# Patient Record
Sex: FEMALE
[De-identification: names, ages, dates, MRNs, and addresses within clinical notes are randomized per-mention and may not be internally consistent; named-entity substitution may affect disease eponyms.]

---

## 2018-04-07 ENCOUNTER — HOSPITAL ENCOUNTER (EMERGENCY)
Dept: HOSPITAL 39 - ER | Age: 28
Discharge: HOME | End: 2018-04-07
Payer: SELF-PAY

## 2018-04-07 VITALS — SYSTOLIC BLOOD PRESSURE: 127 MMHG | DIASTOLIC BLOOD PRESSURE: 72 MMHG | TEMPERATURE: 97.1 F

## 2018-04-07 VITALS — OXYGEN SATURATION: 99 %
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
## 2018-04-07 DIAGNOSIS — T78.40XA: Primary | ICD-10-CM

## 2018-04-07 NOTE — ED.PDOC
History of Present Illness





- General


Chief Complaint: Skin/Abrasion/Tear


Stated Complaint: sudden rash over face, itchy throat


Time Seen by Provider: 04/07/18 22:30


Source: patient


Exam Limitations: no limitations





- History of Present Illness


Initial Comments: 





the patient is a 28-year-old female presenting to the emergency room secondary 

to the feeling of some mild swelling in her throat as well as a mild rash 

developing on her face over the last hour while she was eating tacos at a 

restaurant.  She does not have any history of any food allergies.  No history 

of any asthma or reactive airway.  She is not in any distress.  The rash is 

very minor.  She is talking without any difficulty in breathing without any 

difficulty.  There are no audible wheezes.  She is does not appear to be in any 

distress.


Timing/Duration: 1 hour


Severity: mild


Improving Factors: nothing


Worsening Factors: nothing


Associated Symptoms: denies symptoms


Allergies/Adverse Reactions: 


Allergies





NO KNOWN ALLERGY Allergy (Verified 04/07/18 22:41)


 








Home Medications: 


Ambulatory Orders





NK [NK]  09/29/15 











Review of Systems





- Review of Systems


Constitutional: States: no symptoms reported


EENTM: States: nose congestion


Respiratory: States: no symptoms reported


Cardiology: States: no symptoms reported


Gastrointestinal/Abdominal: States: no symptoms reported


Genitourinary: States: no symptoms reported


Musculoskeletal: States: no symptoms reported


Skin: States: see HPI


Neurological: States: no symptoms reported


Endocrine: States: no symptoms reported


All other Systems: No Change from Baseline





Past Medical History (General)





- Patient Medical History


Hx Asthma: No


Hx Hypertension: No


Hx Diabetes: No


Surgical History: no surgical history





- Vaccination History


Hx Influenza Vaccination: No





- Social History


Hx Tobacco Use: No


Hx Alcohol Use: No





- Female History


Patient is a Female of Child Bearing Age (10 -59 yrs old): No


Patient Pregnant: No





Family Medical History





- Family History


  ** Mother


Family History: Unknown


Living Status: Unknown





Physical Exam





- Physical Exam


General Appearance: Alert, Comfortable, No apparent distress


Eye Exam: bilateral normal


Ears, Nose, Throat: hearing grossly normal, normal ENT inspection, normal 

pharynx


Neck: full range of motion, supple


Respiratory: lungs clear, normal breath sounds, no respiratory distress, no 

accessory muscle use


Cardiovascular/Chest: normal peripheral pulses, regular rate, rhythm, no edema


Peripheral Pulses: radial,right: 2+, radial,left: 2+


Gastrointestinal/Abdominal: non tender, soft


Rectal Exam: deferred


Extremity: non-tender, normal inspection, no pedal edema, normal capillary 

refill


Neurologic: CNs II-XII nml as tested, alert, normal mood/affect, oriented x 3


Skin Exam: other - mild papular erythematous rash diffusely over the face


Comments: 





 Vital Signs - 24 hr











  04/07/18





  22:40


 


Temperature 97.6 F


 


Pulse Rate [ 72





left] 


 


Respiratory 18





Rate 


 


Blood Pressure 121/75





[left] 


 


O2 Sat by Pulse 99





Oximetry 














Progress





- Progress


Progress: 





04/07/18 23:11


the patient is a 28-year-old female presenting to the emergency room with what 

appears to be a mild allergic reaction with no evidence of anaphylaxis at this 

time.  Patient was given a dose of oral prednisone and Benadryl.  She needs to 

return to the emergency room for any worsening.





Departure





- Departure


Clinical Impression: 


Allergic reaction


Qualifiers:


 Encounter type: initial encounter Qualified Code(s): T78.40XA - Allergy, 

unspecified, initial encounter





Disposition: Discharge to Home or Self Care


Condition: Fair


Departure Forms:  ED Discharge - Pt. Copy, Patient Portal Self Enrollment


Instructions:  DI for Abrasion


Diet: regular diet


Activity: increase activity as tolerated


Referrals: 


Asad Salas MD [Primary Care Provider] - 1-2 Weeks


Home Medications: 


Ambulatory Orders





NK [NK]  09/29/15 








Additional Instructions: 


the patient is a 28-year-old female presenting to the emergency room with what 

appears to be a mild allergic reaction with no evidence of anaphylaxis at this 

time.  Patient was given a dose of oral prednisone and Benadryl.  She needs to 

return to the emergency room for any worsening. follow-up with primary care as 

previously directed.

## 2018-06-10 ENCOUNTER — HOSPITAL ENCOUNTER (EMERGENCY)
Dept: HOSPITAL 39 - ER | Age: 28
Discharge: HOME | End: 2018-06-10
Payer: SELF-PAY

## 2018-06-10 VITALS — DIASTOLIC BLOOD PRESSURE: 90 MMHG | OXYGEN SATURATION: 97 % | SYSTOLIC BLOOD PRESSURE: 129 MMHG

## 2018-06-10 VITALS — TEMPERATURE: 98.6 F

## 2018-06-10 DIAGNOSIS — Z3A.00: ICD-10-CM

## 2018-06-10 DIAGNOSIS — O26.851: Primary | ICD-10-CM

## 2018-06-10 PROCEDURE — 84702 CHORIONIC GONADOTROPIN TEST: CPT

## 2018-06-10 PROCEDURE — 84703 CHORIONIC GONADOTROPIN ASSAY: CPT

## 2018-06-10 PROCEDURE — 85025 COMPLETE CBC W/AUTO DIFF WBC: CPT

## 2018-06-10 PROCEDURE — 86900 BLOOD TYPING SEROLOGIC ABO: CPT

## 2018-06-10 PROCEDURE — 80048 BASIC METABOLIC PNL TOTAL CA: CPT

## 2018-06-10 PROCEDURE — 36415 COLL VENOUS BLD VENIPUNCTURE: CPT

## 2018-06-10 PROCEDURE — 86901 BLOOD TYPING SEROLOGIC RH(D): CPT

## 2018-06-10 NOTE — ED.PDOC
History of Present Illness





- General


Chief Complaint: OB/GYN Problem


Stated Complaint: Positive pregnancy test


Time Seen by Provider: 06/10/18 08:48


Source: patient


Exam Limitations: no limitations





- History of Present Illness


Initial Comments: 





Ashley Lang 29 y/o female stated that she had positive home pregnancy test and 

today noticed vaginal spotting without abdominal pain.She is  Ab-o [id number] 

may 2018.Denies chronic medical problem. 


Timing/Duration: yesterday


Quality: other - vaginal spotting


Onset Location: vaginal


Radiation: none


Activites at Onset: none


Prior abdominal problems: none


Sexual intercourse history: single partner


Improving Factors: nothing


Worsening Factors: nothing


Associated Symptoms: denies symptoms, other - see hpi


Allergies/Adverse Reactions: 


Allergies





NO KNOWN ALLERGY Allergy (Verified 06/10/18 08:52)


 








Home Medications: 


Ambulatory Orders





NK [NK]  09/29/15 











Review of Systems





- Review of Systems


Constitutional: States: no symptoms reported


EENTM: States: no symptoms reported


Respiratory: States: no symptoms reported


Cardiology: States: no symptoms reported


Gastrointestinal/Abdominal: States: no symptoms reported


Genitourinary: States: see HPI, other





Past Medical History (General)





- Patient Medical History


Hx Asthma: No


Hx Hypertension: No


Hx Diabetes: No


Surgical History: no surgical history





- Vaccination History


Hx Influenza Vaccination: No





- Social History


Hx Tobacco Use: No


Hx Alcohol Use: No


Hx Physical Abuse: No


Hx Emotional Abuse: No


Hx Suspected Abuse: No





- Female History


Patient is a Female of Child Bearing Age (10 -59 yrs old): Yes


Hx Last Menstrual Period: 18


Patient Pregnant: Yes


Expected Date of Delivery:: 04/15/19





Family Medical History





- Family History


  ** Mother


Family History: Unknown


Living Status: Unknown





Physical Exam





- Physical Exam


General Appearance: Alert, Comfortable, No apparent distress


Eyes, Ears, Nose, Throat Exam: normal ENT inspection


Neck: normal inspection


Cardiovascular/Respiratory: regular rate, rhythm, normal peripheral pulses


Gastrointestinal/Abdominal: non tender, soft


Pelvic Exam: external exam normal, blood - cervical os;cervix closed,no adnexal 

mass or tenderness


Back Exam: no CVA tenderness, no vertebral tenderness


Extremity: no pedal edema, no calf tenderness


Neurologic: alert, oriented x 3


Skin Exam: normal color, warm/dry





Progress





- Progress


Progress: 





06/10/18 10:45


 





06/10/18 09:03


ABO/RH Stat 


IV Care:Saline Lock per Protoc QSHIFT 


HCG,QUALITATIVE URINE Stat 








 Laboratory Results - last 24 hr











  06/10/18 06/10/18 06/10/18





  09:14 09:14 09:14


 


WBC  8.8  


 


RBC  5.07  


 


Hgb  14.8  


 


Hct  43.6  


 


MCV  86.0  


 


MCH  29.2  


 


MCHC  34.0  


 


RDW  12.7  


 


Plt Count  244  


 


MPV  8.7  


 


Absolute Neuts (auto)  5.90  


 


Absolute Lymphs (auto)  2.40  


 


Absolute Monos (auto)  0.40  


 


Absolute Eos (auto)  0.10  


 


Absolute Basos (auto)  0.00  


 


Neutrophils %  67.0  


 


Lymphocytes %  26.8  


 


Monocytes %  4.7  


 


Eosinophils %  1.1  


 


Basophils %  0.4  


 


Sodium   136 


 


Potassium   3.4 L 


 


Chloride   105 


 


Carbon Dioxide   25 


 


Anion Gap   9.4 L 


 


BUN   10 


 


Creatinine   0.64 


 


BUN/Creatinine Ratio   15.6 


 


Random Glucose   95 


 


Serum Osmolality   270.8 L 


 


Calcium   8.6 


 


Serum HCG, Qual    Negative


 


Beta HCG, Quant   4.1 














- Results/Orders


Results/Orders: 





 Vital Signs - 8 hr











  06/10/18





  08:52


 


Temperature 98.6 F


 


Pulse Rate [ 91 H





Left Radial] 


 


Respiratory 18





Rate 


 


Blood Pressure 124/87





[Left Arm] 


 


O2 Sat by Pulse 96





Oximetry 














Departure





- Departure


Clinical Impression: 


 Vaginal bleeding





Time of Disposition: 10:44


Disposition: Discharge to Home or Self Care


Condition: Good


Departure Forms:  ED Discharge - Pt. Copy, Patient Portal Self Enrollment


Instructions:  DI for Vaginal Bleeding


Referrals: 


Asad Salas MD [Primary Care Provider] - 1-2 Weeks


Home Medications: 


Ambulatory Orders





NK [NK]  09/29/15 








Additional Instructions: 


Follow up with your OB GYN

## 2018-09-04 ENCOUNTER — HOSPITAL ENCOUNTER (EMERGENCY)
Dept: HOSPITAL 39 - ER | Age: 28
Discharge: HOME | End: 2018-09-04
Payer: COMMERCIAL

## 2018-09-04 VITALS — OXYGEN SATURATION: 99 % | SYSTOLIC BLOOD PRESSURE: 138 MMHG | DIASTOLIC BLOOD PRESSURE: 72 MMHG

## 2018-09-04 VITALS — TEMPERATURE: 96.3 F

## 2018-09-04 DIAGNOSIS — O99.511: Primary | ICD-10-CM

## 2018-09-04 DIAGNOSIS — Z3A.01: ICD-10-CM

## 2018-09-04 DIAGNOSIS — J06.9: ICD-10-CM

## 2018-09-04 NOTE — ED.PDOC
History of Present Illness





- General


Chief Complaint: General


Stated Complaint: Sore throat, nausea, temp


Time Seen by Provider: 09/04/18 08:45


Source: patient


Exam Limitations: no limitations





- History of Present Illness


Initial Comments: 





Patient presents with three days of a sore throat. She has also had a fever 

that "comes and goes" but she has measured it up to 102 yesterday. Has had a 

cough productive of green sputum and green nasal exudates.  She is 7 weeks 

pregnant by LMP. She has had N/V of stomach mucous and is unsure if it is 

related to the pregnancy or not. No other complaints. No sick contacts.


Timing/Duration: other - 3 days


Severity: moderate


Improving Factors: nothing


Worsening Factors: nothing


Associated Symptoms: cough, fever/chills, nausea/vomiting


Allergies/Adverse Reactions: 


Allergies





NO KNOWN ALLERGY Allergy (Verified 06/10/18 08:52)


 








Home Medications: 


Ambulatory Orders





Benzonatate Perles [Tessalon Perles] 100 mg PO Q8HRS #20 cap 09/04/18 











Review of Systems





- Review of Systems


Constitutional: States: see HPI


EENTM: States: see HPI


Respiratory: States: see HPI


Cardiology: States: no symptoms reported


Gastrointestinal/Abdominal: States: see HPI


Genitourinary: States: no symptoms reported


Musculoskeletal: States: no symptoms reported


Skin: States: no symptoms reported


Neurological: States: no symptoms reported


Endocrine: States: no symptoms reported


Hematologic/Lymphatic: States: no symptoms reported





Past Medical History (General)





- Patient Medical History


Hx Stroke: No


Hx Asthma: No


Hx Congestive Heart Failure: No


Hx Hypertension: No


Hx Diabetes: No





- Vaccination History


Hx Tetanus, Diphtheria Vaccination: No


Hx Influenza Vaccination: No


Hx Pneumococcal Vaccination: No





- Social History


Hx Tobacco Use: No


Hx Alcohol Use: No


Hx Physical Abuse: No


Hx Emotional Abuse: No


Hx Suspected Abuse: No





- Female History


Hx Last Menstrual Period: 05/08/18


Patient Pregnant: Yes


Expected Date of Delivery:: 04/15/19





Family Medical History





- Family History


  ** Mother


Family History: Unknown


Living Status: Unknown





Physical Exam





- Physical Exam


General Appearance: Alert


Eye Exam: bilateral normal


Ears, Nose, Throat: pharyngeal erythema, tonsillar exudate, tonsillar swelling


Neck: non-tender, full range of motion, supple


Respiratory: chest non-tender, lungs clear, normal breath sounds


Cardiovascular/Chest: normal peripheral pulses, regular rate, rhythm, no edema


Gastrointestinal/Abdominal: normal bowel sounds, non tender, soft


Back Exam: normal inspection, no CVA tenderness, no vertebral tenderness


Extremity: normal range of motion, non-tender, normal inspection


Neurologic: CNs II-XII nml as tested, no motor/sensory deficits, alert, normal 

mood/affect, oriented x 3


Skin Exam: normal color


Lymphatic: no adenopathy





Progress





- Progress


Progress: 





09/04/18 10:16


Rapid strep negative. Influenza negative.  Likely a viral URI. Care 

instructions given. E.D. warnings given. Questions were elicited and answered. 

Patient voiced understanding and agreement with the plan.





Departure





- Departure


Clinical Impression: 


 Upper respiratory infection





Disposition: Discharge to Home or Self Care


Condition: Good


Departure Forms:  ED Discharge - Pt. Copy, Patient Portal Self Enrollment


Diet: resume usual diet


Activity: increase activity as tolerated


Referrals: 


Eve Zurita NP [Primary Care Provider] - 1-2 Weeks


Prescriptions: 


Benzonatate Perles [Tessalon Perles] 100 mg PO Q8HRS #20 cap


Home Medications: 


Ambulatory Orders





Benzonatate Perles [Tessalon Perles] 100 mg PO Q8HRS #20 cap 09/04/18 








Additional Instructions: 


Increase your  oral fluids. You may take over the counter cough and cold 

formulas for symptom relief. Tylenol for pain or fever control.  Take the 

prescription medication as directed. Observe routine hand washing before and 

after physical contact with someone else.

## 2018-12-17 ENCOUNTER — HOSPITAL ENCOUNTER (EMERGENCY)
Dept: HOSPITAL 39 - ER | Age: 28
Discharge: HOME | End: 2018-12-17
Payer: COMMERCIAL

## 2018-12-17 VITALS — SYSTOLIC BLOOD PRESSURE: 124 MMHG | OXYGEN SATURATION: 100 % | TEMPERATURE: 98.7 F | DIASTOLIC BLOOD PRESSURE: 88 MMHG

## 2018-12-17 DIAGNOSIS — Z3A.22: ICD-10-CM

## 2018-12-17 DIAGNOSIS — O98.812: Primary | ICD-10-CM

## 2018-12-17 DIAGNOSIS — S71.152A: ICD-10-CM

## 2018-12-17 DIAGNOSIS — L02.416: ICD-10-CM

## 2018-12-17 NOTE — ED.PDOC
History of Present Illness





- General


Chief Complaint: Skin/Abrasion/Tear


Stated Complaint: left upper thigh spider bite


Time Seen by Provider: 12/17/18 20:00


Source: patient


Exam Limitations: no limitations





- History of Present Illness


Initial Comments: 





C/O BITE TO L THIGH. 3 DAYS DURATION. HAS GOTTEN WORSE TODAY. 


Severity: mild


Improving Factors: nothing


Worsening Factors: nothing


Allergies/Adverse Reactions: 


Allergies





NO KNOWN ALLERGY Allergy (Verified 12/17/18 19:49)


   








Home Medications: 


Ambulatory Orders





Cephalexin Monohydrate [Keflex] 500 mg PO TID #30 cap 12/17/18 


Prenatal Vit W/ Ferrous Fumara [Prenatal] 1 tab PO DAILY 12/17/18 











Review of Systems





- Review of Systems


Constitutional: Denies: chills, fever


EENTM: States: other - C/O SENSATION HER THROAT IS TIGHT. 


Respiratory: Denies: cough, short of breath


Cardiology: Denies: chest pain, palpitations


Gastrointestinal/Abdominal: Denies: diarrhea, nausea


Genitourinary: States: other - PREGNANT, NO CRAMPING NO BLEEDING


Musculoskeletal: States: back pain - L UPPER


Skin: States: other - REDNESS SWELLING, MILD TTP


Neurological: States: no symptoms reported


Endocrine: States: no symptoms reported


Hematologic/Lymphatic: States: no symptoms reported





Past Medical History (General)





- Patient Medical History


Hx Seizures: No


Hx Stroke: No


Hx Dementia: No


Hx Asthma: No


Hx of COPD: No


Hx Cardiac Disorders: No


Hx Congestive Heart Failure: No


Hx Pacemaker: No


Hx Hypertension: No


Hx Thyroid Disease: No


Hx Diabetes: No


Hx Gastroesophageal Reflux: No


Hx Renal Disease: No


Hx Cancer: No


Hx of HIV: No


Hx Hepatitis C: No


Hx MRSA: No





- Vaccination History


Hx Tetanus, Diphtheria Vaccination: No


Hx Influenza Vaccination: Yes


Hx Pneumococcal Vaccination: No





- Social History


Hx Tobacco Use: No


Hx Chewing Tobacco Use: No


Hx Alcohol Use: No


Hx Substance Use: No


Hx Substance Use Treatment: No


Hx Depression: No


Hx Physical Abuse: No


Hx Emotional Abuse: No


Hx Suspected Abuse: No





- Female History


Hx Last Menstrual Period: 05/08/18


Patient Pregnant: Yes


Expected Date of Delivery:: 04/15/19





Family Medical History





- Family History


  ** Mother


Family History: Unknown


Living Status: Unknown





Physical Exam





- Physical Exam


General Appearance: Alert, No apparent distress


Eye Exam: bilateral normal


Ears, Nose, Throat: normal ENT inspection, normal pharynx - WIDELY PATENT, other

 - NO ERYTHEMA, 


Neck: non-tender, full range of motion, supple


Respiratory: lungs clear, normal breath sounds


Cardiovascular/Chest: regular rate, rhythm, no murmur


Gastrointestinal/Abdominal: non tender, soft, no organomegaly - GRAVID


Back Exam: normal inspection, no CVA tenderness, other - NO TTP L UPPER THORAX


Extremity: normal range of motion, normal inspection, no pedal edema


Neurologic: alert, normal mood/affect


Skin Exam: other - SWELLING TO L UPPER THIGH WITH ABOUT 5CM ERYTHEMA. SMALL 2CM 

AREA SWELLING, WITH CENTRAL OPENING. SMALL AMOUNT OF PUS EXPRESSED, SOME 

FLUCTUENCE AND INDURATION. NO LYMPHANGITIS. 


Lymphatic: no adenopathy





Progress





- Progress


Progress: 





12/17/18 20:09


ADVISED I&D PT DECLINES, WANTS TO TAKE ABX AND F/U WITH HER PCP





Departure





- Departure


Clinical Impression: 


 Abscess





Bite of left thigh


Qualifiers:


 Encounter type: initial encounter Qualified Code(s): S71.152A - Open bite, left

 thigh, initial encounter





Pregnancy


Qualifiers:


 Weeks of gestation: 22 weeks Qualified Code(s): Z3A.22 - 22 weeks gestation of 

pregnancy





Time of Disposition: 20:01


Disposition: Discharge to Home or Self Care


Condition: Good


Departure Forms:  ED Discharge - Pt. Copy, Patient Portal Self Enrollment


Instructions:  DI for Wound Infection


Referrals: 


Eve Zurita NP [Primary Care Provider] - 1-2 Weeks


Prescriptions: 


Cephalexin Monohydrate [Keflex] 500 mg PO TID #30 cap


Home Medications: 


Ambulatory Orders





Cephalexin Monohydrate [Keflex] 500 mg PO TID #30 cap 12/17/18 


Prenatal Vit W/ Ferrous Fumara [Prenatal] 1 tab PO DAILY 12/17/18